# Patient Record
Sex: MALE | Race: BLACK OR AFRICAN AMERICAN | HISPANIC OR LATINO | Employment: FULL TIME | URBAN - METROPOLITAN AREA
[De-identification: names, ages, dates, MRNs, and addresses within clinical notes are randomized per-mention and may not be internally consistent; named-entity substitution may affect disease eponyms.]

---

## 2018-06-21 ENCOUNTER — HISTORICAL (OUTPATIENT)
Dept: ADMINISTRATIVE | Facility: HOSPITAL | Age: 58
End: 2018-06-21

## 2018-11-19 ENCOUNTER — HISTORICAL (OUTPATIENT)
Dept: ADMINISTRATIVE | Facility: HOSPITAL | Age: 58
End: 2018-11-19

## 2018-11-19 LAB
ABS NEUT (OLG): 7.5 X10(3)/MCL (ref 2.1–9.2)
ALBUMIN SERPL-MCNC: 4.7 GM/DL (ref 3.4–5)
ALBUMIN/GLOB SERPL: 1.21 {RATIO} (ref 1.5–2.5)
ALP SERPL-CCNC: 105 UNIT/L (ref 38–126)
ALT SERPL-CCNC: 31 UNIT/L (ref 7–52)
AST SERPL-CCNC: 25 UNIT/L (ref 15–37)
BILIRUB SERPL-MCNC: 1.3 MG/DL (ref 0.2–1)
BILIRUBIN DIRECT+TOT PNL SERPL-MCNC: 0.2 MG/DL (ref 0–0.5)
BILIRUBIN DIRECT+TOT PNL SERPL-MCNC: 1.1 MG/DL
BUN SERPL-MCNC: 18 MG/DL (ref 7–18)
CALCIUM SERPL-MCNC: 9.5 MG/DL (ref 8.5–10)
CHLORIDE SERPL-SCNC: 103 MMOL/L (ref 98–107)
CHOLEST SERPL-MCNC: 236 MG/DL (ref 0–200)
CHOLEST/HDLC SERPL: 3.9 {RATIO}
CO2 SERPL-SCNC: 29 MMOL/L (ref 21–32)
CREAT SERPL-MCNC: 1.18 MG/DL (ref 0.6–1.3)
ERYTHROCYTE [DISTWIDTH] IN BLOOD BY AUTOMATED COUNT: 12.9 % (ref 11.5–17)
GLOBULIN SER-MCNC: 3.9 GM/DL (ref 1.2–3)
GLUCOSE SERPL-MCNC: 133 MG/DL (ref 74–106)
HCT VFR BLD AUTO: 48.1 % (ref 42–52)
HDLC SERPL-MCNC: 61 MG/DL (ref 35–60)
HGB BLD-MCNC: 15.6 GM/DL (ref 14–18)
LDLC SERPL CALC-MCNC: 149 MG/DL (ref 0–129)
LYMPHOCYTES # BLD AUTO: 2 X10(3)/MCL (ref 0.6–3.4)
LYMPHOCYTES NFR BLD AUTO: 19.1 % (ref 13–40)
MCH RBC QN AUTO: 31 PG (ref 27–31.2)
MCHC RBC AUTO-ENTMCNC: 32 GM/DL (ref 32–36)
MCV RBC AUTO: 96 FL (ref 80–94)
MONOCYTES # BLD AUTO: 0.9 X10(3)/MCL (ref 0.1–1.3)
MONOCYTES NFR BLD AUTO: 8.2 % (ref 0.1–24)
NEUTROPHILS NFR BLD AUTO: 72.7 % (ref 47–80)
PLATELET # BLD AUTO: 259 X10(3)/MCL (ref 130–400)
PMV BLD AUTO: 8.8 FL (ref 9.4–12.4)
POTASSIUM SERPL-SCNC: 3.7 MMOL/L (ref 3.5–5.1)
PROT SERPL-MCNC: 8.6 GM/DL (ref 6.4–8.2)
PSA SERPL-MCNC: 0.95 NG/ML (ref 0–3.5)
RBC # BLD AUTO: 5.03 X10(6)/MCL (ref 4.7–6.1)
SODIUM SERPL-SCNC: 140 MMOL/L (ref 136–145)
TRIGL SERPL-MCNC: 91 MG/DL (ref 30–150)
TSH SERPL-ACNC: 3.31 MIU/ML (ref 0.35–4.94)
VLDLC SERPL CALC-MCNC: 18.2 MG/DL
WBC # SPEC AUTO: 10.4 X10(3)/MCL (ref 4.5–11.5)

## 2018-11-20 LAB
EST. AVERAGE GLUCOSE BLD GHB EST-MCNC: 128 MG/DL
HBA1C MFR BLD: 6.1 % (ref 4.4–6.4)

## 2019-01-04 LAB — CRC RECOMMENDATION EXT: NORMAL

## 2019-01-28 ENCOUNTER — HISTORICAL (OUTPATIENT)
Dept: ADMINISTRATIVE | Facility: HOSPITAL | Age: 59
End: 2019-01-28

## 2019-01-28 LAB
ABS NEUT (OLG): 8 X10(3)/MCL (ref 2.1–9.2)
ALBUMIN SERPL-MCNC: 4.4 GM/DL (ref 3.4–5)
ALBUMIN/GLOB SERPL: 1.38 {RATIO} (ref 1.5–2.5)
ALP SERPL-CCNC: 111 UNIT/L (ref 38–126)
ALT SERPL-CCNC: 34 UNIT/L (ref 7–52)
AST SERPL-CCNC: 26 UNIT/L (ref 15–37)
BILIRUB SERPL-MCNC: 1 MG/DL (ref 0.2–1)
BILIRUBIN DIRECT+TOT PNL SERPL-MCNC: 0.2 MG/DL (ref 0–0.5)
BILIRUBIN DIRECT+TOT PNL SERPL-MCNC: 0.8 MG/DL
BUN SERPL-MCNC: 23 MG/DL (ref 7–18)
CALCIUM SERPL-MCNC: 9.3 MG/DL (ref 8.5–10)
CHLORIDE SERPL-SCNC: 101 MMOL/L (ref 98–107)
CO2 SERPL-SCNC: 28 MMOL/L (ref 21–32)
CREAT SERPL-MCNC: 1.54 MG/DL (ref 0.6–1.3)
ERYTHROCYTE [DISTWIDTH] IN BLOOD BY AUTOMATED COUNT: 11.8 % (ref 11.5–17)
GLOBULIN SER-MCNC: 3.2 GM/DL (ref 1.2–3)
GLUCOSE SERPL-MCNC: 128 MG/DL (ref 74–106)
HCT VFR BLD AUTO: 43.8 % (ref 42–52)
HGB BLD-MCNC: 14.3 GM/DL (ref 14–18)
LYMPHOCYTES # BLD AUTO: 2.1 X10(3)/MCL (ref 0.6–3.4)
LYMPHOCYTES NFR BLD AUTO: 19.2 % (ref 13–40)
MCH RBC QN AUTO: 30.8 PG (ref 27–31.2)
MCHC RBC AUTO-ENTMCNC: 33 GM/DL (ref 32–36)
MCV RBC AUTO: 94 FL (ref 80–94)
MONOCYTES # BLD AUTO: 0.7 X10(3)/MCL (ref 0.1–1.3)
MONOCYTES NFR BLD AUTO: 6.5 % (ref 0.1–24)
NEUTROPHILS NFR BLD AUTO: 74.3 % (ref 47–80)
PLATELET # BLD AUTO: 229 X10(3)/MCL (ref 130–400)
PMV BLD AUTO: 8.8 FL (ref 9.4–12.4)
POTASSIUM SERPL-SCNC: 4.2 MMOL/L (ref 3.5–5.1)
PROT SERPL-MCNC: 7.6 GM/DL (ref 6.4–8.2)
RBC # BLD AUTO: 4.64 X10(6)/MCL (ref 4.7–6.1)
SODIUM SERPL-SCNC: 138 MMOL/L (ref 136–145)
TSH SERPL-ACNC: 2.38 MIU/ML (ref 0.35–4.94)
WBC # SPEC AUTO: 10.8 X10(3)/MCL (ref 4.5–11.5)

## 2019-11-27 ENCOUNTER — HISTORICAL (OUTPATIENT)
Dept: ADMINISTRATIVE | Facility: HOSPITAL | Age: 59
End: 2019-11-27

## 2019-11-27 LAB
ABS NEUT (OLG): 6.6 X10(3)/MCL (ref 2.1–9.2)
ALBUMIN SERPL-MCNC: 4.4 GM/DL (ref 3.4–5)
ALBUMIN/GLOB SERPL: 1.26 {RATIO} (ref 1.5–2.5)
ALP SERPL-CCNC: 97 UNIT/L (ref 38–126)
ALT SERPL-CCNC: 37 UNIT/L (ref 7–52)
AST SERPL-CCNC: 27 UNIT/L (ref 15–37)
BILIRUB SERPL-MCNC: 1.1 MG/DL (ref 0.2–1)
BILIRUBIN DIRECT+TOT PNL SERPL-MCNC: 0.2 MG/DL (ref 0–0.5)
BILIRUBIN DIRECT+TOT PNL SERPL-MCNC: 0.9 MG/DL
BUN SERPL-MCNC: 16 MG/DL (ref 7–18)
CALCIUM SERPL-MCNC: 9.4 MG/DL (ref 8.5–10)
CHLORIDE SERPL-SCNC: 105 MMOL/L (ref 98–107)
CHOLEST SERPL-MCNC: 161 MG/DL (ref 0–200)
CHOLEST/HDLC SERPL: 2.8 {RATIO}
CO2 SERPL-SCNC: 29 MMOL/L (ref 21–32)
CREAT SERPL-MCNC: 1.07 MG/DL (ref 0.6–1.3)
ERYTHROCYTE [DISTWIDTH] IN BLOOD BY AUTOMATED COUNT: 12.6 % (ref 11.5–17)
EST. AVERAGE GLUCOSE BLD GHB EST-MCNC: 126 MG/DL
GLOBULIN SER-MCNC: 3.5 GM/DL (ref 1.2–3)
GLUCOSE SERPL-MCNC: 151 MG/DL (ref 74–106)
HBA1C MFR BLD: 6 % (ref 4.4–6.4)
HCT VFR BLD AUTO: 46.1 % (ref 42–52)
HDLC SERPL-MCNC: 57 MG/DL (ref 35–60)
HGB BLD-MCNC: 16 GM/DL (ref 14–18)
LDLC SERPL CALC-MCNC: 94 MG/DL (ref 0–129)
LYMPHOCYTES # BLD AUTO: 2.2 X10(3)/MCL (ref 0.6–3.4)
LYMPHOCYTES NFR BLD AUTO: 23.5 % (ref 13–40)
MCH RBC QN AUTO: 30.7 PG (ref 27–31.2)
MCHC RBC AUTO-ENTMCNC: 35 GM/DL (ref 32–36)
MCV RBC AUTO: 88 FL (ref 80–94)
MONOCYTES # BLD AUTO: 0.6 X10(3)/MCL (ref 0.1–1.3)
MONOCYTES NFR BLD AUTO: 5.9 % (ref 0.1–24)
NEUTROPHILS NFR BLD AUTO: 70.6 % (ref 47–80)
PLATELET # BLD AUTO: 258 X10(3)/MCL (ref 130–400)
PMV BLD AUTO: 9 FL (ref 9.4–12.4)
POTASSIUM SERPL-SCNC: 4.3 MMOL/L (ref 3.5–5.1)
PROT SERPL-MCNC: 7.9 GM/DL (ref 6.4–8.2)
PSA SERPL-MCNC: 2.08 NG/ML (ref 0–3.5)
RBC # BLD AUTO: 5.22 X10(6)/MCL (ref 4.7–6.1)
SODIUM SERPL-SCNC: 142 MMOL/L (ref 136–145)
TRIGL SERPL-MCNC: 76 MG/DL (ref 30–150)
TSH SERPL-ACNC: 2.2 MIU/ML (ref 0.35–4.94)
VLDLC SERPL CALC-MCNC: 15.2 MG/DL
WBC # SPEC AUTO: 9.4 X10(3)/MCL (ref 4.5–11.5)

## 2020-07-22 ENCOUNTER — HISTORICAL (OUTPATIENT)
Dept: ADMINISTRATIVE | Facility: HOSPITAL | Age: 60
End: 2020-07-22

## 2020-07-22 LAB
ABS NEUT (OLG): 6.2 X10(3)/MCL (ref 2.1–9.2)
CHOLEST SERPL-MCNC: 223 MG/DL (ref 0–200)
CHOLEST/HDLC SERPL: 4.1 {RATIO}
ERYTHROCYTE [DISTWIDTH] IN BLOOD BY AUTOMATED COUNT: 13.3 % (ref 11.5–17)
EST. AVERAGE GLUCOSE BLD GHB EST-MCNC: 134 MG/DL
HBA1C MFR BLD: 6.3 % (ref 4.4–6.4)
HCT VFR BLD AUTO: 46.9 % (ref 42–52)
HDLC SERPL-MCNC: 54 MG/DL (ref 35–60)
HGB BLD-MCNC: 15.4 GM/DL (ref 14–18)
LDLC SERPL CALC-MCNC: 144 MG/DL (ref 0–129)
LYMPHOCYTES # BLD AUTO: 2.2 X10(3)/MCL (ref 0.6–3.4)
LYMPHOCYTES NFR BLD AUTO: 23.7 % (ref 13–40)
MCH RBC QN AUTO: 30.7 PG (ref 27–31.2)
MCHC RBC AUTO-ENTMCNC: 33 GM/DL (ref 32–36)
MCV RBC AUTO: 94 FL (ref 80–94)
MONOCYTES # BLD AUTO: 0.9 X10(3)/MCL (ref 0.1–1.3)
MONOCYTES NFR BLD AUTO: 9.5 % (ref 0.1–24)
NEUTROPHILS NFR BLD AUTO: 66.8 % (ref 47–80)
PLATELET # BLD AUTO: 150 X10(3)/MCL (ref 130–400)
PMV BLD AUTO: 10.5 FL (ref 9.4–12.4)
RBC # BLD AUTO: 5.01 X10(6)/MCL (ref 4.7–6.1)
TRIGL SERPL-MCNC: 156 MG/DL (ref 30–150)
VLDLC SERPL CALC-MCNC: 31.2 MG/DL
WBC # SPEC AUTO: 9.3 X10(3)/MCL (ref 4.5–11.5)

## 2021-01-27 ENCOUNTER — HISTORICAL (OUTPATIENT)
Dept: ADMINISTRATIVE | Facility: HOSPITAL | Age: 61
End: 2021-01-27

## 2021-01-27 LAB
ABS NEUT (OLG): 5.6 X10(3)/MCL (ref 2.1–9.2)
ALBUMIN SERPL-MCNC: 4.1 GM/DL (ref 3.4–5)
ALBUMIN/GLOB SERPL: 1.58 {RATIO} (ref 1.5–2.5)
ALP SERPL-CCNC: 83 UNIT/L (ref 38–126)
ALT SERPL-CCNC: 28 UNIT/L (ref 7–52)
AST SERPL-CCNC: 27 UNIT/L (ref 15–37)
BILIRUB SERPL-MCNC: 1.2 MG/DL (ref 0.2–1)
BILIRUBIN DIRECT+TOT PNL SERPL-MCNC: 0.2 MG/DL (ref 0–0.5)
BILIRUBIN DIRECT+TOT PNL SERPL-MCNC: 1 MG/DL
BUN SERPL-MCNC: 17 MG/DL (ref 7–18)
CALCIUM SERPL-MCNC: 9.1 MG/DL (ref 8.5–10.1)
CHLORIDE SERPL-SCNC: 103 MMOL/L (ref 98–107)
CHOLEST SERPL-MCNC: 195 MG/DL (ref 0–200)
CHOLEST/HDLC SERPL: 3.4 {RATIO}
CO2 SERPL-SCNC: 25 MMOL/L (ref 21–32)
CREAT SERPL-MCNC: 1.16 MG/DL (ref 0.6–1.3)
ERYTHROCYTE [DISTWIDTH] IN BLOOD BY AUTOMATED COUNT: 12.4 % (ref 11.5–17)
EST. AVERAGE GLUCOSE BLD GHB EST-MCNC: 128 MG/DL
GLOBULIN SER-MCNC: 2.6 GM/DL (ref 1.2–3)
GLUCOSE SERPL-MCNC: 134 MG/DL (ref 74–106)
HBA1C MFR BLD: 6.1 % (ref 4.4–6.4)
HCT VFR BLD AUTO: 44.4 % (ref 42–52)
HDLC SERPL-MCNC: 57 MG/DL (ref 35–60)
HGB BLD-MCNC: 14.9 GM/DL (ref 14–18)
LDLC SERPL CALC-MCNC: 124 MG/DL (ref 0–129)
LYMPHOCYTES # BLD AUTO: 1.6 X10(3)/MCL (ref 0.6–3.4)
LYMPHOCYTES NFR BLD AUTO: 20.9 % (ref 13–40)
MCH RBC QN AUTO: 30.7 PG (ref 27–31.2)
MCHC RBC AUTO-ENTMCNC: 34 GM/DL (ref 32–36)
MCV RBC AUTO: 91 FL (ref 80–94)
MONOCYTES # BLD AUTO: 0.6 X10(3)/MCL (ref 0.1–1.3)
MONOCYTES NFR BLD AUTO: 8.1 % (ref 0.1–24)
NEUTROPHILS NFR BLD AUTO: 71 % (ref 47–80)
PLATELET # BLD AUTO: 231 X10(3)/MCL (ref 130–400)
PMV BLD AUTO: 9.3 FL (ref 9.4–12.4)
POTASSIUM SERPL-SCNC: 4.4 MMOL/L (ref 3.5–5.1)
PROT SERPL-MCNC: 6.7 GM/DL (ref 6.4–8.2)
PSA SERPL-MCNC: 2.09 NG/ML (ref 0–3.5)
RBC # BLD AUTO: 4.86 X10(6)/MCL (ref 4.7–6.1)
SODIUM SERPL-SCNC: 139 MMOL/L (ref 136–145)
TRIGL SERPL-MCNC: 97 MG/DL (ref 30–150)
TSH SERPL-ACNC: 3.15 MIU/ML (ref 0.35–4.94)
VLDLC SERPL CALC-MCNC: 19.4 MG/DL
WBC # SPEC AUTO: 7.8 X10(3)/MCL (ref 4.5–11.5)

## 2021-07-07 ENCOUNTER — HISTORICAL (OUTPATIENT)
Dept: ADMINISTRATIVE | Facility: HOSPITAL | Age: 61
End: 2021-07-07

## 2021-07-07 LAB
ALBUMIN SERPL-MCNC: 4.5 GM/DL (ref 3.4–5)
ALBUMIN/GLOB SERPL: 1.55 {RATIO} (ref 1.5–2.5)
ALP SERPL-CCNC: 109 UNIT/L (ref 38–126)
ALT SERPL-CCNC: 40 UNIT/L (ref 7–52)
AST SERPL-CCNC: 27 UNIT/L (ref 15–37)
BILIRUB SERPL-MCNC: 1.2 MG/DL (ref 0.2–1)
BILIRUBIN DIRECT+TOT PNL SERPL-MCNC: 0.2 MG/DL (ref 0–0.5)
BILIRUBIN DIRECT+TOT PNL SERPL-MCNC: 1 MG/DL
BUN SERPL-MCNC: 14 MG/DL (ref 7–18)
CALCIUM SERPL-MCNC: 9.8 MG/DL (ref 8.5–10.1)
CHLORIDE SERPL-SCNC: 100 MMOL/L (ref 98–107)
CHOLEST SERPL-MCNC: 245 MG/DL (ref 0–200)
CHOLEST/HDLC SERPL: 4.5 {RATIO}
CO2 SERPL-SCNC: 31 MMOL/L (ref 21–32)
CREAT SERPL-MCNC: 1.15 MG/DL (ref 0.6–1.3)
EST CREAT CLEARANCE SER (OHS): 127.47 ML/MIN
EST. AVERAGE GLUCOSE BLD GHB EST-MCNC: 157 MG/DL
GLOBULIN SER-MCNC: 2.9 GM/DL (ref 1.2–3)
GLUCOSE SERPL-MCNC: 162 MG/DL (ref 74–106)
HBA1C MFR BLD: 7.1 % (ref 4.4–6.4)
HDLC SERPL-MCNC: 55 MG/DL (ref 35–60)
LDLC SERPL CALC-MCNC: 167 MG/DL (ref 0–129)
POTASSIUM SERPL-SCNC: 4 MMOL/L (ref 3.5–5.1)
PROT SERPL-MCNC: 7.4 GM/DL (ref 6.4–8.2)
SODIUM SERPL-SCNC: 139 MMOL/L (ref 136–145)
TRIGL SERPL-MCNC: 190 MG/DL (ref 30–150)
VLDLC SERPL CALC-MCNC: 38 MG/DL

## 2022-01-03 ENCOUNTER — HISTORICAL (OUTPATIENT)
Dept: ADMINISTRATIVE | Facility: HOSPITAL | Age: 62
End: 2022-01-03

## 2022-04-10 ENCOUNTER — HISTORICAL (OUTPATIENT)
Dept: ADMINISTRATIVE | Facility: HOSPITAL | Age: 62
End: 2022-04-10

## 2022-04-28 VITALS
DIASTOLIC BLOOD PRESSURE: 88 MMHG | SYSTOLIC BLOOD PRESSURE: 146 MMHG | OXYGEN SATURATION: 94 % | BODY MASS INDEX: 40.84 KG/M2 | HEIGHT: 70 IN | WEIGHT: 285.25 LBS

## 2022-05-03 NOTE — HISTORICAL [facility] CERNER
This is a historical note converted from Harinder. Formatting and pictures may have been removed.  Please reference Harinder for original formatting and attached multimedia. Chief Complaint  CPX FASTING  History of Present Illness  58?year old male presents for wellness visit.  Blood Pressure - 130/96  BMI - 41.42  Immunizations - up to date  Prostate?Cancer Screening - PSA and HANNAH?today  Colon Cancer Screening - up to date, screening colonoscopy 2012 repeat in 5 years  Diet - Average  Exercise - Minimal  Hypertension remains elevated despite compliance with amlodipine 10 mg daily.  Anxiety stable with?Zoloft 50 mg daily and Xanax 0.5 mg?daily as needed for overwhelming anxiety  Hypercholesterolemia is?previously controlled with?Crestor 20 mg daily. ?Patient tolerating well without any side effects.  Review of Systems  Constitutional:?No weight loss, no fever, no fatigue, no chills, no night sweats,?no weakness  Eyes:?No blurred vision,?no redness,?no drainage,?no ocular?pain  HEENT:?No sore throat,?no ear pain, no sinus pressure, no nasal congestion, no rhinorrhea, no postnasal drip  Respiratory:?No cough, no wheezing, no sputum production, no shortness of breath  Cardiovascular:?No chest pain, no palpitations, no dyspnea on exertion,?no orthopnea  Gastrointestinal:?No nausea, no vomiting, no abdominal pain, no diarrhea,?no constipation, no melena,?no hematochezia  Genitourinary:?No dysuria, no hematuria, no frequency, no urgency, no incontinence, no discharge  Musculoskeletal:?No myalgias, no arthralgias, no weakness, no joint effusion, no edema  Integumentary:?No rashes, no hives, no itching, no lesions, no jaundice  Neurologic:?No headaches, no numbness, no tingling, no weakness, no dizziness  Psychiatric:?No anxiety, no irritability, no depression,?no suicidal ideations, no?homicidal ideations,?no delusions, no hallucinations  Endocrine:?No polyuria, no polydipsia, no polyphagia  Hematology:?No bruising, no  lymphadenopathy,?no paleness  ?  Physical Exam  Vitals & Measurements  HR:?70(Peripheral)? BP:?130/96?  HT:?177.5?cm? HT:?177.5?cm? WT:?130.5?kg? WT:?130.5?kg? BMI:?41.42?  General:?Well developed, Well-nourished, in No acute distress, A&O x 4  Eye:?PERRLA, EOMI, Clear conjunctiva, Eyelids unremarkable  Ears:?Bilateral EAC clear?and?Bilateral TM clear  Nose:? Mucosa normal, No rhinorrhea, No lesions  O/P:??No?erythema,?No tonsillar hypertrophy,?No tonsillar exudates,?No cobblestoning  Neck:?Soft, Nontender, No thyromegaly, Full range of motion, No cervical lymphadenopathy, No JVD  Cardiovascular:?Regular rate and rhythm, No murmurs, No gallops, No rubs  Respiratory:?Clear to auscultation bilaterally, No wheezes, No rhonchi, No?crackles  Abdomen:? Soft, Nontender, No hepatosplenomegaly, Normal and equal bowel sounds, No masses, No rebound, No guarding  Musculoskeletal:? No tenderness, FROM, No joint abnormality, No clubbing, No cyanosis,No?edema  Neurologic:? No motor or sensory deficits, Reflexes 2+ throughout, CN II-XII grossly intact  Integumentary:?No rashes or skin lesions  ?HANNAH - No anal growths or lesions, No rectal masses, Normal sphincter tone, No prostate nodules,? Symmetric, No tenderness,? prostate firm, prostate [id number]  Assessment/Plan  1.?Wellness examination?Z00.00  ?CBC, CMP, FLP, TSH, PSA  Discussed importance of maintaining a balanced diet and regular exercise  HANNAH performed today  Ordered:  Clinic Follow up, *Est. 12/17/18 15:30:00 CST, Order for future visit, Hypercholesteremia, [facility]  External Referral, Screening colonoscopy, GI, [facility], 11/19/18 21:14:00 CST, Wellness examination  Preventative Health Care Est 40-64 years [id number] PC, Wellness examination, [facility], 11/19/18 14:09:00 CST  ?  2.?Needs flu shot?Z23  ?Influenza vaccine IM today  Ordered:  Clinic Follow up, *Est. 12/17/18 15:30:00 CST, Order for future visit, Hypercholesteremia, [facility]  [facility]  ?  3.?Anxiety?F41.9  ?Continue Zoloft 50 mg daily and Xanax 0.5 mg daily as needed for overwhelming anxiety #30 with 2 refills  Ordered:  Clinic Follow up, *Est. 12/17/18 15:30:00 CST, Order for future visit, Hypercholesteremia, [facility]  ?  4.?Hypertension?I10  ?Discontinue amlodipine and start valsartan HCT 80/12.5 mg daily  Ordered:  Clinic Follow up, *Est. 12/17/18 15:30:00 CST, Order for future visit, Hypercholesteremia, [facility]  ?  5.?Hypercholesteremia?E78.00  ?CMP and FLP today  Ordered:  Clinic Follow up, *Est. 12/17/18 15:30:00 CST, Order for future visit, Hypercholesteremia, [facility]  ?  Orders:  alPRAzolam, 0.5 mg = 1 tab(s), Oral, Daily, # 30 tab(s), 2 Refill(s), Pharmacy: [facility]/pharmacy #[id number]  hydrochlorothiazide-valsartan, 1 tab(s), Oral, Daily, # 30 tab(s), 5 Refill(s), Pharmacy: [facility]/pharmacy #[id number]   Problem List/Past Medical History  Ongoing  Anxiety  BPH (benign prostatic hyperplasia)  Contact dermatitis  ED (erectile dysfunction)  Hypercholesteremia  Hypertension  Obesity  Historical  SAKINA - Obstructive sleep apnea  Procedure/Surgical History  Excision of gynecomastia   Medications  alPRAzolam 0.5 mg oral tablet, 0.5 mg= 1 tab(s), Oral, Daily, 2 refills  hydrochlorothiazide-valsartan 12.5 mg-80 mg oral tablet, 1 tab(s), Oral, Daily, 5 refills  Mobic 15 mg oral tablet, 15 mg= 1 tab(s), Oral, Daily, PRN, 5 refills  rosuvastatin 20 mg oral tablet, 20 mg= 1 tab(s), Oral, Daily, 3 refills  sildenafil 20 mg oral tablet, See Instructions, 5 refills  Zoloft 50 mg oral tablet, 50 mg= 1 tab(s), Oral, Daily, 3 refills  Allergies  No Known Medication Allergies  Social History  Alcohol  Current, 03/07/2018  Employment/School  Employed, Work/School description: teacher., 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  Cirrhosis of liver: Mother.  Hypertension.: Father.  Immunizations  Vaccine Date Status   influenza  virus vaccine, inactivated 11/19/2018 Given   tetanus/diphtheria/pertussis, acel(Tdap) 07/2016 Recorded   Health Maintenance  Health Maintenance  ???Pending?(in the next year)  ??? ??OverDue  ??? ? ? ?Diabetes Screening due??and every?  ??? ? ? ?Influenza Vaccine due??and every?  ??? ??Due?  ??? ? ? ?ADL Screening due??11/19/18??and every 1??year(s)  ??? ? ? ?Alcohol Misuse Screening due??11/19/18??and every 1??year(s)  ??? ? ? ?Aspirin Therapy for CVD Prevention due??11/19/18??and every 1??year(s)  ??? ? ? ?Colorectal Screening due??11/19/18??and every?  ??? ? ? ?Depression Screening due??11/19/18??and every?  ??? ? ? ?Hypertension Maintenance-Medication Prescribed due??11/19/18??and every 1??year(s)  ??? ? ? ?Hypertension Management-Education due??11/19/18??and every 1??year(s)  ??? ? ? ?Smoking Cessation due??11/19/18??Variable frequency  ???Satisfied?(in the past 1 year)  ??? ??Satisfied?  ??? ? ? ?Blood Pressure Screening on??11/19/18.??Satisfied by Triny Cornejo  ??? ? ? ?Body Mass Index Check on??11/19/18.??Satisfied by Triny Cornejo  ??? ? ? ?Diabetes Screening on??11/19/18.??Satisfied by Dameon Kohli  ??? ? ? ?Hypertension Management-BMP on??11/19/18.??Satisfied by Dameon Kohli  ??? ? ? ?Influenza Vaccine on??11/19/18.??Satisfied by Triny Cornejo  ??? ? ? ?Lipid Screening on??11/19/18.??Satisfied by Dameon Kohli  ??? ? ? ?Obesity Screening on??11/19/18.??Satisfied by Triny Cornejo  ?  ?

## 2022-05-03 NOTE — HISTORICAL [facility] C[facility]
This is a historical note converted from [employer]. Formatting and pictures may have been removed.  Please reference Harinder for original formatting and attached multimedia. Chief Complaint  CPX FASTING  History of Present Illness  61?year old male presents for wellness visit.  Blood Pressure - 160/98  BMI - 40.31  Immunizations - Flu vaccine and Shingrix today  Prostate?Cancer Screening - PSA and HANNAH?today  Colon Cancer Screening - up to date, screening colonoscopy 1/4/19 with Dr. Levy repeat in 5 years  Diet - Average  Exercise - walking daily with work  Hypertension uncontrolled with amlodipine 5 mg daily.??  Anxiety stable with Xanax 0.5 mg?as needed for overwhelming anxiety.?  Hypercholesterolemia?-?? in July.? Pt reported noncompliance with Crestor at that time.?  Prediabetes - HbA1C 6.3 in July.  ED improved with sildenafil 20mg as needed.  Moderate OA right knee not manageable with bracing.?  Review of Systems  Constitutional:?No weight loss, no fever, no fatigue, no chills, no night sweats,?no weakness  Eyes:?No blurred vision,?no redness,?no drainage,?no ocular?pain  HEENT:?No sore throat,?no ear pain, no sinus pressure, no nasal congestion, no rhinorrhea, no postnasal drip  Respiratory:?No cough, no wheezing, no sputum production, no shortness of breath  Cardiovascular:?No chest pain, no palpitations, no dyspnea on exertion,?no orthopnea  Gastrointestinal:?No nausea, no vomiting, no abdominal pain, no diarrhea,?no constipation, no melena,?no hematochezia  Genitourinary:?No dysuria, no hematuria, no frequency, no urgency, no incontinence, no discharge  Musculoskeletal:?No myalgias, no arthralgias, no weakness, no joint effusion, no edema  Integumentary:?No rashes, no hives, no itching, no lesions, no jaundice  Neurologic:?No headaches, no numbness, no tingling, no weakness, no dizziness  Psychiatric:?No anxiety, no irritability, no depression,?no suicidal ideations, no?homicidal ideations,?no  delusions, no hallucinations  Endocrine:?No polyuria, no polydipsia, no polyphagia  Hematology:?No bruising, no lymphadenopathy,?no paleness  ?  Physical Exam  Vitals & Measurements  HR:?60(Peripheral)? BP:?160/98? SpO2:?97%?  HT:?177.50?cm? WT:?127.000?kg? BMI:?40.31?  General:?Well developed, Well-nourished, in No acute distress, A&O x 4  Eye:?PERRLA, EOMI, Clear conjunctiva, Eyelids unremarkable  Ears:?Bilateral EAC clear?and?Bilateral TM clear  Nose:? Mucosa normal, No rhinorrhea, No lesions  O/P:??No?erythema,?No tonsillar hypertrophy,?No tonsillar exudates,?No cobblestoning  Neck:?Soft, Nontender, No thyromegaly, Full range of motion, No cervical lymphadenopathy, No JVD  Cardiovascular:?Regular rate and rhythm, No murmurs, No gallops, No rubs  Respiratory:?Clear to auscultation bilaterally, No wheezes, No rhonchi, No?crackles  Abdomen:? Soft, Nontender, No hepatosplenomegaly, Normal and equal bowel sounds, No masses, No rebound, No guarding  Musculoskeletal:? No tenderness, FROM, No joint abnormality, No clubbing, No cyanosis,No?edema  Neurologic:? No motor or sensory deficits, Reflexes 2+ throughout, CN II-XII grossly intact  Integumentary:?No rashes or skin lesions  ?HANNAH - No anal growths or lesions, No rectal masses, Normal sphincter tone, No prostate nodules,? Symmetric, No tenderness,? prostate firm, prostate [id number]  Assessment/Plan  1.?Wellness examination?Z00.00  ?Discussed the?importance of maintaining a balanced diet and regular exercise  Ordered:  CBC w/ Auto Diff, Routine collect, 01/27/21 8:46:00 CST, Blood, Order for future visit, Stop date 01/27/21 8:46:00 CST, Lab Collect, Wellness examination, 01/27/21 8:46:00 CST  Comprehensive Metabolic Panel, Routine collect, 01/27/21 8:46:00 CST, Blood, Order for future visit, Stop date 01/27/21 8:46:00 CST, Lab Collect, Wellness examination, 01/27/21 8:46:00 CST  Lab Collection Request, 01/27/21 8:46:00 CST, [facility], 01/27/21 8:46:00 CST, Wellness  examination  Lipid Panel, Routine collect, 01/27/21 8:46:00 CST, Blood, Order for future visit, Stop date 01/27/21 8:46:00 CST, Lab Collect, Wellness examination, 01/27/21 8:46:00 CST  Preventative Health Care Est 40-64 years [id number] PC, Wellness examination, [facility], 01/27/21 8:46:00 CST  Thyroid Stimulating Hormone, Routine collect, 01/27/21 8:46:00 CST, Blood, Order for future visit, Stop date 01/27/21 8:46:00 CST, Lab Collect, Wellness examination, 01/27/21 8:46:00 CST  ?  2.?Hypertension?I10  ?Increase amlodipine to 10 mg daily  Blood pressure check within 1 month  ?  3.?Prediabetes?R73.03  Ordered:  Hemoglobin A1c, Routine collect, 01/27/21 8:47:00 CST, Blood, Order for future visit, Stop date 01/27/21 8:47:00 CST, Lab Collect, Prediabetes, 01/27/21 8:47:00 CST  ?  4.?Hypercholesterolemia?E78.00  ?CMP and FLP today  ?  5.?Anxiety?F41.9  ?Continue Xanax 0.5 mg as needed for overwhelming anxiety  ?  6.?ED (erectile dysfunction)?N52.9  ?Refilled sildenafil 20 mg 2-5 tabs as needed  ?  7.?Osteoarthritis of knee?M17.10  ?Start meloxicam 15 mg daily  ?  8.?Encounter for immunization?Z23  Ordered:  Influenza Virus Vaccine, Inactivated, 0.5 mL, IM, Once-Unscheduled, first dose 01/27/21 8:47:00 CST  zoster vaccine, inactivated, 0.5 mL, IM, Once-NOW, first dose 01/27/21 8:47:00 CST, stop date 01/27/21 8:47:00 CST  ?  9.?Screening for prostate cancer?Z12.5  ?HANNAH performed today  Ordered:  Prostate Specific Antigen, Routine collect, 01/27/21 8:46:00 CST, Blood, Order for future visit, Stop date 01/27/21 8:46:00 CST, Lab Collect, Screening for prostate cancer, 01/27/21 8:46:00 CST  ?  Orders:  alPRAzolam, 0.5 mg = 1 tab(s), Oral, Daily, PRN PRN as needed for anxiety, # 30 tab(s), 2 Refill(s), Pharmacy: [facility]/pharmacy #[id number], 177.5, cm, Height/Length Dosing, 01/27/21 8:06:00 CST, 127, kg, Weight Dosing, 01/27/21 8:06:00 CST  amLODIPine, 10 mg = 1 tab(s), Oral, Daily, # 30 tab(s), 2 Refill(s), Pharmacy: [facility]/pharmacy  #[id number], 177.5, cm, Height/Length Dosing, 01/27/21 8:06:00 CST, 127, kg, Weight Dosing, 01/27/21 8:06:00 CST  meloxicam, 15 mg = 1 tab(s), Oral, Daily, # 30 tab(s), 5 Refill(s), [id number], 177.5, cm, Height/Length Dosing, 01/27/21 8:06:00 CST, 127, kg, Weight Dosing, 01/27/21 8:06:00 CST  rosuvastatin, 40 mg = 1 tab(s), Oral, Daily, # 90 tab(s), 3 Refill(s), [id number], 177.5, cm, Height/Length Dosing, 01/27/21 8:06:00 CST, 127, kg, Weight Dosing, 01/27/21 8:06:00 CST   Problem List/Past Medical History  Ongoing  Anxiety  BPH (benign prostatic hyperplasia)  Contact dermatitis  ED (erectile dysfunction)  Hypercholesterolemia  Hypertension  Obesity  Osteoarthritis of knee  Prediabetes  Historical  SAKINA - Obstructive sleep apnea  Procedure/Surgical History  Excision of gynecomastia   Medications  alPRAzolam 0.5 mg oral tablet, 0.5 mg= 1 tab(s), Oral, Daily, PRN, 2 refills  amlodipine 10 mg oral tablet, 10 mg= 1 tab(s), Oral, Daily, 2 refills  Mobic 15 mg oral tablet, 15 mg= 1 tab(s), Oral, Daily, 5 refills  rosuvastatin 40 mg oral tablet, 40 mg= 1 tab(s), Oral, Daily, 3 refills  sildenafil 20 mg oral tablet, See Instructions, 2 refills  Allergies  No Known Medication Allergies  Social History  Abuse/Neglect  No, 01/27/2021  Alcohol  Current, 03/07/2018  Employment/School  Employed, Retired, 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  CVA - Cerebrovascular accident: Father.  Cirrhosis of liver: Mother.  Hypertension.: Father.  Sister: History is negative  Sister: History is negative  Sister: History is negative  Son: History is negative  Son: History is negative  Immunizations  Vaccine Date Status   zoster vaccine, inactivated 01/27/2021 Given   influenza virus vaccine,  inactivated 01/27/2021 Given   pneumococcal 13-valent conjugate vaccine 11/27/2019 Given   influenza virus vaccine, inactivated 11/01/2019 Recorded   influenza virus vaccine, inactivated 11/19/2018 Given   tetanus/diphtheria/pertussis, acel(Tdap) 07/2016 Recorded   influenza virus vaccine, inactivated 10/16/2014 Recorded   influenza virus vaccine, inactivated 10/15/2012 Recorded   Health Maintenance  Health Maintenance  ???Pending?(in the next year)  ??? ??OverDue  ??? ? ? ?Hypertension Management-BMP due??11/26/20??and every 1??year(s)  ??? ??Due?  ??? ? ? ?Alcohol Misuse Screening due??01/02/21??and every 1??year(s)  ??? ? ? ?ADL Screening due??01/27/21??and every 1??year(s)  ??? ? ? ?Aspirin Therapy for CVD Prevention due??01/27/21??and every 1??year(s)  ??? ? ? ?Depression Screening due??01/27/21??Unknown Frequency  ??? ? ? ?Hypertension Management-Education due??01/27/21??and every 1??year(s)  ??? ??Due In Future?  ??? ? ? ?Diabetes Screening not due until??07/22/21??and every 1??year(s)  ??? ? ? ?Influenza Vaccine not due until??10/01/21??and every 1??day(s)  ??? ? ? ?Obesity Screening not due until??01/01/22??and every 1??year(s)  ???Satisfied?(in the past 1 year)  ??? ??Satisfied?  ??? ? ? ?Blood Pressure Screening on??01/27/21.??Satisfied by Triny Cornejo LPN  ??? ? ? ?Body Mass Index Check on??01/27/21.??Satisfied by Triny Cornejo LPN  ??? ? ? ?Diabetes Screening on??07/22/20.??Satisfied by Chata Giang  ??? ? ? ?Hypertension Management-Blood Pressure on??01/27/21.??Satisfied by Triny Cornejo LPN  ??? ? ? ?Influenza Vaccine on??01/27/21.??Satisfied by Triny Cornejo LPN  ??? ? ? ?Lipid Screening on??07/22/20.??Satisfied by Emperatriz Raman  ??? ? ? ?Obesity Screening on??01/27/21.??Satisfied by Triny Cornejo LPN  ?

## 2022-05-03 NOTE — HISTORICAL [facility] CERNER
This is a historical note converted from Harinder. Formatting and pictures may have been removed.  Please reference Harinder for original formatting and attached multimedia. Chief Complaint  6 MTH F/U  SINUS DRIP, CHEST CONGESTION  History of Present Illness  Hypertension remains?uncontrolled with amlodipine?10 mg daily.??  Anxiety stable with Xanax 0.5 mg?as needed for overwhelming anxiety.?  Hypercholesterolemia?-?? in January.??Patient currently treated with rosuvastatin 40 mg daily.  Prediabetes - HbA1C 6.1 in January.  ED improved with sildenafil 20mg as needed.  Moderate OA right knee managing with Mobic 15 mg daily.?  Recurrent right lower quadrant abdominal pain?approximately once or twice a month.? Patient has a history of a hernia repair with mesh.? Patient does note change in stool caliber at times.  4 to 5-day history of nasal congestion,?sinus pressure and rhinorrhea. ?Denies any fever or chills.  Review of Systems  Constitutional:?No weight loss, no fever, no fatigue, no chills, no night sweats,?no weakness  Eyes:?No blurred vision,?no redness,?no drainage,?no ocular?pain  HEENT:?No sore throat,?no ear pain, sinus pressure, nasal congestion, rhinorrhea, postnasal drip  Respiratory:?No cough, no wheezing, no sputum production, no shortness of breath  Cardiovascular:?No chest pain, no palpitations, no dyspnea on exertion,?no orthopnea  Gastrointestinal:?No nausea, no vomiting, abdominal pain, no diarrhea,?no constipation, no melena,?no hematochezia  Genitourinary:?No dysuria, no hematuria, no frequency, no urgency, no incontinence, no discharge  Musculoskeletal:?No myalgias, arthralgias, no weakness, no joint effusion, no edema  Integumentary:?No rashes, no hives, no itching, no lesions, no jaundice  Neurologic:?No headaches, no numbness, no tingling, no weakness, no dizziness  Psychiatric:?anxiety, no irritability, no depression,?no suicidal ideations, no?homicidal ideations,?no delusions, no  hallucinations  Endocrine:?No polyuria, no polydipsia, no polyphagia  Hematology:?No bruising, no lymphadenopathy,?no paleness  ?  Physical Exam  Vitals & Measurements  HR:?74(Peripheral)? BP:?140/94? SpO2:?96%?  HT:?177.50?cm? WT:?130.300?kg? BMI:?41.36?  General:?Well developed, Well-nourished, in No acute distress, A&O x 4  Eye:?PERRLA, EOMI, Clear conjunctiva, Eyelids unremarkable  Ears:?Bilateral EAC clear?and?Bilateral TM clear  Nose:? Mucosa normal, No rhinorrhea, No lesions  O/P:??No?erythema,?No tonsillar hypertrophy,?No tonsillar exudates,?No cobblestoning  Neck:?Soft, Nontender, No thyromegaly, Full range of motion, No cervical lymphadenopathy, No JVD  Cardiovascular:?Regular rate and rhythm, No murmurs, No gallops, No rubs  Respiratory:?Clear to auscultation bilaterally, No wheezes, No rhonchi, No?crackles  Abdomen:? Soft, right lower quadrant tenderness to palpation, No hepatosplenomegaly, Normal and equal bowel sounds, No masses, No rebound, No guarding  Musculoskeletal:? No tenderness, FROM, No joint abnormality, No clubbing, No cyanosis,No?edema  Neurologic:? No motor or sensory deficits, Reflexes 2+ throughout, CN II-XII grossly intact  Integumentary:?No rashes or skin lesions  ?  Assessment/Plan  1.?Anxiety?F41.9  ?Refilled Xanax 0.5 mg daily as needed for overwhelming anxiety  Ordered:  Clinic Follow up, *Est. 01/07/22 3:00:00 CST, Wellness Physical, Order for future visit, Anxiety  Hypertension  Prediabetes  Hypercholesterolemia, [facility]  Office/Outpatient Visit Level 4 Established [id number] PC, Anxiety  Prediabetes  Hypertension  Hypercholesterolemia  Osteoarthritis of knee  Acute URI  Encounter for immunization  Right lower quadrant abdominal pain, [facility] AMB - [facility], 07/07/21 10:41:00 CDT  ?  2.?Prediabetes?R73.03  ?Hemoglobin A1c today  Ordered:  Clinic Follow up, *Est. 01/07/22 3:00:00 CST, Wellness Physical, Order for future visit, Anxiety  Hypertension  Prediabetes   Hypercholesterolemia, [facility]  Office/Outpatient Visit Level 4 Established 27972 PC, Anxiety  Prediabetes  Hypertension  Hypercholesterolemia  Osteoarthritis of knee  Acute URI  Encounter for immunization  Right lower quadrant abdominal pain, [facility] - [facility], 07/07/21 10:41:00 CDT  ?  3.?Hypertension?I10  ?Continue amlodipine 10 mg daily  Start HCTZ 12.5 mg daily  Ordered:  Clinic Follow up, *Est. 01/07/22 3:00:00 CST, Wellness Physical, Order for future visit, Anxiety  Hypertension  Prediabetes  Hypercholesterolemia, [facility]  Office/Outpatient Visit Level 4 Established 82173 PC, Anxiety  Prediabetes  Hypertension  Hypercholesterolemia  Osteoarthritis of knee  Acute URI  Encounter for immunization  Right lower quadrant abdominal pain, [facility] - [facility], 07/07/21 10:41:00 CDT  ?  4.?Hypercholesterolemia?E78.00  ?CMP and FLP today  Ordered:  Clinic Follow up, *Est. 01/07/22 3:00:00 CST, Wellness Physical, Order for future visit, Anxiety  Hypertension  Prediabetes  Hypercholesterolemia, [facility]  Office/Outpatient Visit Level 4 Established 60690 PC, Anxiety  Prediabetes  Hypertension  Hypercholesterolemia  Osteoarthritis of knee  Acute URI  Encounter for immunization  Right lower quadrant abdominal pain, [facility] AMB - [facility], 07/07/21 10:41:00 CDT  ?  5.?Osteoarthritis of knee?M17.10  ?Continue meloxicam 15 mg daily  Ordered:  Office/Outpatient Visit Level 4 Established 51213 PC, Anxiety  Prediabetes  Hypertension  Hypercholesterolemia  Osteoarthritis of knee  Acute URI  Encounter for immunization  Right lower quadrant abdominal pain, [facility] - [facility], 07/07/21 10:41:00 CDT  ?  6.?Acute URI?J06.9  ?Celestone 6 mg IM today  If symptoms fail to improve patient to contact office  Ordered:  Office/Outpatient Visit Level 4 Established 48297 PC, Anxiety  Prediabetes  Hypertension  Hypercholesterolemia  Osteoarthritis of knee  Acute URI  Encounter for immunization  Right lower quadrant  abdominal pain, [facility] - [facility], 07/07/21 10:41:00 CDT  ?  7.?Encounter for immunization?Z23  ?Shingrix today second dose  Ordered:  Office/Outpatient Visit Level 4 Established 18877 PC, Anxiety  Prediabetes  Hypertension  Hypercholesterolemia  Osteoarthritis of knee  Acute URI  Encounter for immunization  Right lower quadrant abdominal pain, [facility] AMB - [facility], 07/07/21 10:41:00 CDT  ?  8.?Right lower quadrant abdominal pain?R10.31  ?Concerning regarding?adhesions?and possible?partial?bowel obstruction  Ordered:  Office/Outpatient Visit Level 4 Established 16659 PC, Anxiety  Prediabetes  Hypertension  Hypercholesterolemia  Osteoarthritis of knee  Acute URI  Encounter for immunization  Right lower quadrant abdominal pain, [facility] AMB - [facility], 07/07/21 10:41:00 CDT  Schedule Diagnostics Study, CT Abdomen/Pelvis with and without contrast, Yes Oral Contrast Requested, Next Available, 07/07/21 10:42:00 CDT, Right lower quadrant abdominal pain  ?  Orders:  alPRAzolam, 0.5 mg = 1 tab(s), Oral, Daily, PRN PRN as needed for anxiety, # 30 tab(s), 3 Refill(s), [id number], 177.5, cm, Height/Length Dosing, 07/07/21 10:16:00 CDT, 130.3, kg, Weight Dosing, 07/07/21 10:16:00 CDT  hydrochlorothiazide, 12.5 mg = 1 tab(s), Oral, Daily, # 30 tab(s), 5 Refill(s), [id number], 177.5, cm, Height/Length Dosing, 07/07/21 10:16:00 CDT, 130.3, kg, Weight Dosing, 07/07/21 10:16:00 CDT  sildenafil, See Instructions, 5 tabs prior to sexual activity, # 50 tab(s), 5 Refill(s), Pharmacy: [facility], 177.5, cm, Height/Length Dosing, 07/07/21 10:16:00 CDT, 130.3, kg, Weight Dosing, 07/07/21 10:16:00 CDT  Referrals  Clinic Follow up, *Est. 01/07/22 3:00:00 CST, Wellness Physical, Order for future visit, Anxiety  Hypertension  Prediabetes  Hypercholesterolemia, [facility] AFP  Clinic Follow up, Order for future visit   Problem List/Past Medical History  Ongoing  Anxiety  BPH (benign prostatic  hyperplasia)  Contact dermatitis  ED (erectile dysfunction)  Hypercholesterolemia  Hypertension  Obesity  Osteoarthritis of knee  Prediabetes  Historical  SAKINA - Obstructive sleep apnea  Procedure/Surgical History  Excision of gynecomastia   Medications  alPRAzolam 0.5 mg oral tablet, 0.5 mg= 1 tab(s), Oral, Daily, PRN, 3 refills  amLODIPine 10 mg oral tablet, 10 mg= 1 tab(s), Oral, Daily, 3 refills  hydrochlorothiazide 12.5 mg oral tablet, 12.5 mg= 1 tab(s), Oral, Daily, 5 refills  Mobic 15 mg oral tablet, 15 mg= 1 tab(s), Oral, Daily, 5 refills  rosuvastatin 40 mg oral tablet, 40 mg= 1 tab(s), Oral, Daily, 3 refills  sildenafil 20 mg oral tablet, See Instructions, 5 refills  Allergies  No Known Medication Allergies  Social History  Abuse/Neglect  No, 07/07/2021  Alcohol  Current, 03/07/2018  Employment/School  Employed, Retired, 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  CVA - Cerebrovascular accident: Father.  Cirrhosis of liver: Mother.  Hypertension.: Father.  Sister: History is negative  Sister: History is negative  Sister: History is negative  Son: History is negative  Son: History is negative  Immunizations  Vaccine Date Status   zoster vaccine, inactivated 07/07/2021 Given   COVID-19, vector nr, [id number] - [facility] 03/05/2021 Recorded   zoster vaccine, inactivated 01/27/2021 Given   influenza virus vaccine, inactivated 01/27/2021 Given   pneumococcal 13-valent conjugate vaccine 11/27/2019 Given   influenza virus vaccine, inactivated 11/01/2019 Recorded   influenza virus vaccine, inactivated 11/19/2018 Given   tetanus/diphtheria/pertussis, acel(Tdap) 07/2016 Recorded   influenza virus vaccine, inactivated 10/16/2014 Recorded   influenza virus vaccine, inactivated 10/15/2012 Recorded   Health  Maintenance  Health Maintenance  ???Pending?(in the next year)  ??? ??OverDue  ??? ? ? ?Alcohol Misuse Screening due??01/02/21??and every 1??year(s)  ??? ??Due?  ??? ? ? ?ADL Screening due??07/07/21??and every 1??year(s)  ??? ? ? ?Aspirin Therapy for CVD Prevention due??07/07/21??and every 1??year(s)  ??? ? ? ?Depression Screening due??07/07/21??Unknown Frequency  ??? ? ? ?Hypertension Management-Education due??07/07/21??and every 1??year(s)  ??? ??Due In Future?  ??? ? ? ?Influenza Vaccine not due until??10/01/21??and every 1??day(s)  ??? ? ? ?Obesity Screening not due until??01/01/22??and every 1??year(s)  ??? ? ? ?Diabetes Screening not due until??01/27/22??and every 1??year(s)  ??? ? ? ?Hypertension Management-BMP not due until??01/27/22??and every 1??year(s)  ???Satisfied?(in the past 1 year)  ??? ??Satisfied?  ??? ? ? ?Blood Pressure Screening on??07/07/21.??Satisfied by Triny Cornejo LPN  ??? ? ? ?Body Mass Index Check on??07/07/21.??Satisfied by Triny Cornejo LPN  ??? ? ? ?Diabetes Screening on??07/07/21.??Satisfied by Dameon Kohli  ??? ? ? ?Hypertension Management-BMP on??07/07/21.??Satisfied by Dameon Kohli  ??? ? ? ?Influenza Vaccine on??01/27/21.??Satisfied by Triny Cornejo LPN  ??? ? ? ?Lipid Screening on??07/07/21.??Satisfied by Dameon Kohli  ??? ? ? ?Obesity Screening on??07/07/21.??Satisfied by Triny Cornejo LPN  ?

## 2022-05-03 NOTE — HISTORICAL [facility]
This is a historical note converted from Harinder. Formatting and pictures may have been removed.  Please reference Harinder for original formatting and attached multimedia. Chief Complaint  CPX FASTING  History of Present Illness  59?year old male presents for wellness visit.  Blood Pressure - 134/96  BMI - 40.18  Immunizations -denies pneumococcal vaccine  Prostate?Cancer Screening - PSA and HANNAH?today  Colon Cancer Screening - up to date, screening colonoscopy 2012 repeat in 5 years  Diet - Average  Exercise - Minimal  Hypertension previously controlled with amlodipine 5 mg daily. ?Patient does admit to?compliance lately.  Anxiety stable with Xanax 0.5 mg?as needed for overwhelming anxiety.? Pt requires 5 times weekly.?  Hypercholesterolemia?- ?Patient tolerating well without any side effects.? Pt increased Crestor to 40mg by his cardiologist in march.?  HbA1C 6.1 11/20/18.?  Right knee pain, popping, locking,?stiffness, swelling?over the last?4 to 6 weeks. ?Patient had a similar presentation a few years ago which resolved with corticosteroid injection.? Denies any direct injury.  ED improved with sildenafil as needed  Review of Systems  Constitutional:?No weight loss, no fever, no fatigue, no chills, no night sweats,?no weakness  Eyes:?No blurred vision,?no redness,?no drainage,?no ocular?pain  HEENT:?No sore throat,?no ear pain, no sinus pressure, no nasal congestion, no rhinorrhea, no postnasal drip  Respiratory:?No cough, no wheezing, no sputum production, no shortness of breath  Cardiovascular:?No chest pain, no palpitations, no dyspnea on exertion,?no orthopnea  Gastrointestinal:?No nausea, no vomiting, no abdominal pain, no diarrhea,?no constipation, no melena,?no hematochezia  Genitourinary:?No dysuria, no hematuria, no frequency, no urgency, no incontinence, no discharge  Musculoskeletal:?No myalgias,? arthralgias, no weakness, no joint effusion, no edema  Integumentary:?No rashes, no hives, no itching, no  lesions, no jaundice  Neurologic:?No headaches, no numbness, no tingling, no weakness, no dizziness  Psychiatric:?No anxiety, no irritability, no depression,?no suicidal ideations, no?homicidal ideations,?no delusions, no hallucinations  Endocrine:?No polyuria, no polydipsia, no polyphagia  Hematology:?No bruising, no lymphadenopathy,?no paleness  ?  Physical Exam  Vitals & Measurements  HR:?70(Peripheral)? BP:?134/96?  HT:?177.5?cm? WT:?126.6?kg? BMI:?40.18?  General:?Well developed, Well-nourished, in No acute distress, A&O x 4  Eye:?PERRLA, EOMI, Clear conjunctiva, Eyelids unremarkable  Ears:?Bilateral EAC clear?and?Bilateral TM clear  Nose:? Mucosa normal, No rhinorrhea, No lesions  O/P:??No?erythema,?No tonsillar hypertrophy,?No tonsillar exudates,?No cobblestoning  Neck:?Soft, Nontender, No thyromegaly, Full range of motion, No cervical lymphadenopathy, No JVD  Cardiovascular:?Regular rate and rhythm, No murmurs, No gallops, No rubs  Respiratory:?Clear to auscultation bilaterally, No wheezes, No rhonchi, No?crackles  Abdomen:? Soft, Nontender, No hepatosplenomegaly, Normal and equal bowel sounds, No masses, No rebound, No guarding  Musculoskeletal:? No tenderness, FROM, No joint abnormality, No clubbing, No cyanosis,No?edema  Neurologic:? No motor or sensory deficits, Reflexes 2+ throughout, CN II-XII grossly intact  Integumentary:?No rashes or skin lesions  HANNAH - No anal growths or lesions, No rectal masses, Normal sphincter tone, No prostate nodules,? Symmetric, No tenderness,? prostate firm, prostate [id number]  Right?Knee -?Negative?Lachman,?Positive?Asha, No effusion,??mildly limited?ROM, hamstring strength?5/5, quadriceps strength?5/5, No medial or lateral instability,?Medial?joint line?tenderness  Assessment/Plan  1.?Wellness examination?Z00.00  ?CBC, CMP, FLP, TSH today  Discussed the?importance of maintaining a balanced diet and regular exercise  Ordered:  Comprehensive Metabolic Panel, Routine  collect, 11/27/19 11:22:00 CST, Blood, Stop date 11/27/19 11:22:00 CST, Lab Collect, Wellness examination, 11/27/19 11:22:00 CST  Lipid Panel, Routine collect, 11/27/19 11:22:00 CST, Blood, Stop date 11/27/19 11:22:00 CST, Lab Collect, Wellness examination, 11/27/19 11:22:00 CST  Preventative Health Care Est 40-64 years [id number] PC, Wellness examination, [facility] AMB - AFP, 11/27/19 11:19:00 CST  Thyroid Stimulating Hormone, Routine collect, 11/27/19 11:22:00 CST, Blood, Stop date 11/27/19 11:22:00 CST, Lab Collect, Wellness examination, 11/27/19 11:22:00 CST  ?  2.?Anxiety?F41.9  ?Continue Xanax 0.5 mg daily as needed for anxiety  ?  3.?ED (erectile dysfunction)?N52.9  ?Sildenafil as needed  ?  4.?Hypercholesteremia?E78.00  ?CMP and FLP today  ?  5.?Hypertension?I10  ?Continue amlodipine 5 mg daily  ?  6.?Prediabetes?R73.03  ?HbA1C?today  ?  7.?Screening for prostate cancer?Z12.5  ?HANNAH performed  Ordered:  Prostate Specific Antigen, Routine collect, 11/27/19 11:22:00 CST, Blood, Stop date 11/27/19 11:22:00 CST, Lab Collect, Screening for prostate cancer, 11/27/19 11:22:00 CST  ?  8.?Immunization due?Z23  ?Prevnar IM today  ?  9.?Internal derangement of right knee?M23.91  ?Xray Right Knee -moderate?degenerative?joint disease noted?medial greater than lateral  Suspect?meniscus injury?as well  Discussed treatment options.? Patient elects to proceed with corticosteroid injection.? Kenalog 40 mg and lidocaine?1% 4 mL used for intra-articular injection. ?Aspiration attempted prior?without any?significant effusion?obtained. ?Patient tolerated well.  Meloxicam 15 mg daily  If symptoms fail to improve patient will contact my office will consider orthopedic referral  Ordered:  Lidocaine inj., 4 mL, Intra-Articular, Once, first dose 11/27/19 11:41:00 CST, stop date 11/27/19 11:41:00 CST, Mix with Kenalog for injection into affected Joint  triamcinolone, 40 mg, Intra-Articular, Once, first dose 11/27/19 12:00:00 CST, stop date  11/27/19 12:00:00 CST, Mix with Lidocaine for injection into affected joint  XR Knee Right 1 or 2 Views, Routine, 11/27/19 11:19:00 CST, Other (please specify), None, Ambulatory, Rad Type, Internal derangement of right knee, [facility], 11/27/19 11:19:00 CST  ?  Orders:  alPRAzolam, 0.5 mg = 1 tab(s), Oral, Daily, # 30 tab(s), 2 Refill(s), Pharmacy: [facility]/pharmacy #[id number]  meloxicam, 15 mg = 1 tab(s), Oral, Daily, PRN PRN pain, # 30 tab(s), 5 Refill(s), Pharmacy: [facility]/pharmacy #[id number]  asp/inj jnt/bursa, major [id number] PC, 11/27/19 11:41:00 CST, [facility]LINK AMB - AFP, 11/27/19 11:41:00 CST   Problem List/Past Medical History  Ongoing  Anxiety  BPH (benign prostatic hyperplasia)  Contact dermatitis  ED (erectile dysfunction)  Hypercholesteremia  Hypertension  Obesity  Historical  SAKINA - Obstructive sleep apnea  Procedure/Surgical History  Excision of gynecomastia   Medications  alPRAzolam 0.5 mg oral tablet, 0.5 mg= 1 tab(s), Oral, Daily, 2 refills  amLODIPine 5 mg oral tablet, 5 mg= 1 tab(s), Oral, Daily  Kenalog-40 injectable suspension, 40 mg, Intra-Articular, Once  lidocaine 1% injectable solution, 4 mL, Intra-Articular, Once  Mobic 15 mg oral tablet, 15 mg= 1 tab(s), Oral, Daily, PRN, 5 refills  rosuvastatin 40 mg oral tablet, 40 mg= 1 tab(s), Oral, Daily  sildenafil 20 mg oral tablet, See Instructions, 5 refills  Allergies  No Known Medication Allergies  Social History  Abuse/Neglect  No, 11/27/2019  Alcohol  Current, 03/07/2018  Employment/School  Employed, Work/School description: teacher., 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  CVA - Cerebrovascular accident: Father.  Cirrhosis of liver: Mother.  Hypertension.: Father.  Sister: History is negative  Sister: History is negative  Sister: History is negative  Immunizations  Vaccine Date Status   pneumococcal 13-valent conjugate vaccine  11/27/2019 Given   influenza virus vaccine, inactivated 11/01/2019 Recorded   influenza virus vaccine, inactivated 11/19/2018 Given   tetanus/diphtheria/pertussis, acel(Tdap) 07/2016 Recorded   influenza virus vaccine, inactivated 10/16/2014 Recorded   influenza virus vaccine, inactivated 10/15/2012 Recorded   Health Maintenance  Health Maintenance  ???Pending?(in the next year)  ??? ??OverDue  ??? ? ? ?Diabetes Screening due??and every?  ??? ? ? ?Alcohol Misuse Screening due??01/01/19??and every 1??year(s)  ??? ??Due?  ??? ? ? ?ADL Screening due??11/27/19??and every 1??year(s)  ??? ? ? ?Aspirin Therapy for CVD Prevention due??11/27/19??and every 1??year(s)  ??? ? ? ?Depression Screening due??11/27/19??and every?  ??? ? ? ?Hypertension Management-Education due??11/27/19??and every 1??year(s)  ??? ? ? ?Influenza Vaccine due??11/27/19??and every?  ??? ??Due In Future?  ??? ? ? ?Obesity Screening not due until??01/01/20??and every 1??year(s)  ??? ? ? ?Hypertension Management-BMP not due until??01/28/20??and every 1??year(s)  ??? ? ? ?Blood Pressure Screening not due until??06/09/20??and every 1??year(s)  ??? ? ? ?Body Mass Index Check not due until??06/09/20??and every 1??year(s)  ??? ? ? ?Hypertension Management-Blood Pressure not due until??06/09/20??and every 1??year(s)  ???Satisfied?(in the past 1 year)  ??? ??Satisfied?  ??? ? ? ?Blood Pressure Screening on??11/27/19.??Satisfied by Triny Cornejo LPN  ??? ? ? ?Body Mass Index Check on??11/27/19.??Satisfied by Triny Cornejo LPN  ??? ? ? ?Colorectal Screening on??01/04/19.??Satisfied by Anna Villalta  ??? ? ? ?Diabetes Screening on??11/27/19.??Satisfied by Dameon Kohli  ??? ? ? ?Hypertension Management-BMP on??11/27/19.??Satisfied by Dameon Kohli  ??? ? ? ?Influenza Vaccine on??11/01/19.??Satisfied by Shaheen Gómez Jr, MD  ??? ? ? ?Lipid Screening on??11/27/19.??Satisfied by Dameon Kohli  ??? ? ? ?Obesity Screening on??11/27/19.??Satisfied by Clemente  LPN, Triny  ?

## 2022-05-03 NOTE — HISTORICAL [facility]ER
This is a historical note converted from Harinder. Formatting and pictures may have been removed.  Please reference Harinder for original formatting and attached multimedia. Chief Complaint  6 WEEK F/U  History of Present Illness  1/20/19 patient developed?dizziness described as vertigo. ?Associated with palpitations and shortness of breath. ?Symptoms continue to worsen?through the last week.? Patient?then discontinued taking valsartan HCT and Crestor on Thursday due to?these being relatively new medications?for patient.? 1 month ago valsartan HCT?was increased to 160/25 mg daily.? He dose report experiencing chest pressure associated with SOB as well.? Symptoms began to improve starting on thursday.? Symptoms completely resolved by saturday and have not recurred.  Review of Systems  Constitutional:?No weight loss, no fever, no fatigue, no chills, no night sweats,?no weakness  Eyes:?No blurred vision,?no redness,?no drainage,?no ocular?pain  HEENT:?No sore throat,?no ear pain, no sinus pressure, no nasal congestion, no rhinorrhea, no postnasal drip  Respiratory:?No cough, no wheezing, no sputum production, shortness of breath  Cardiovascular:?chest pain, palpitations, no dyspnea on exertion,?no orthopnea  Gastrointestinal:?No nausea, no vomiting, no abdominal pain, no diarrhea,?no constipation, no melena,?no hematochezia  Genitourinary:?No dysuria, no hematuria, no frequency, no urgency, no incontinence, no discharge  Musculoskeletal:?No myalgias, no arthralgias, no weakness, no joint effusion, no edema  Integumentary:?No rashes, no hives, no itching, no lesions, no jaundice  Neurologic:?No headaches, no numbness, no tingling, no weakness, dizziness  Psychiatric:?No anxiety, no irritability, no depression,?no suicidal ideations, no?homicidal ideations,?no delusions, no hallucinations  Endocrine:?No polyuria, no polydipsia, no polyphagia  Hematology:?No bruising, no lymphadenopathy,?no paleness  ?  Physical Exam  Vitals  & Measurements  HR:?70(Peripheral)? BP:?114/90?  HT:?177.5?cm? WT:?126.8?kg? BMI:?40.25?  General:?Well developed, Well-nourished, in No acute distress, A&O x 4  Eye:?PERRLA, EOMI, Clear conjunctiva, Eyelids unremarkable  Ears:?Bilateral EAC clear?and?Bilateral TM clear  Nose:? Mucosa normal, No rhinorrhea, No lesions  O/P:??No?erythema,?No tonsillar hypertrophy,?No tonsillar exudates,?No cobblestoning  Neck:?Soft, Nontender, No thyromegaly, Full range of motion, No cervical lymphadenopathy, No JVD  Cardiovascular:?Regular rate and rhythm, No murmurs, No gallops, No rubs  Respiratory:?Clear to auscultation bilaterally, No wheezes, No rhonchi, No?crackles  Abdomen:? Soft, Nontender, No hepatosplenomegaly, Normal and equal bowel sounds, No masses, No rebound, No guarding  Musculoskeletal:? No tenderness, FROM, No joint abnormality, No clubbing, No cyanosis,No?edema  Neurologic:? No motor or sensory deficits, Reflexes 2+ throughout, CN II-XII grossly intact  Integumentary:?No rashes or skin lesions  ?  Assessment/Plan  1.?Dizziness?R42  ?EKG - NSR, No ST or T wave changes, left axis deviation  CBC, CMP, TSH today  Ordered:  Automated Diff, Routine collect, 01/28/19 16:46:00 CST, Blood, Collected, Stop date 01/28/19 16:46:00 CST, Lab Collect, Dizziness, 01/28/19 16:46:00 CST  CBC w/ Auto Diff, Routine collect, 01/28/19 16:46:00 CST, Blood, Stop date 01/28/19 16:46:00 CST, Lab Collect, Dizziness, 01/28/19 16:46:00 CST  Comprehensive Metabolic Panel, Routine collect, 01/28/19 16:46:00 CST, Blood, Stop date 01/28/19 16:46:00 CST, Lab Collect, Dizziness, 01/28/19 16:46:00 CST  External Referral, Chest Pain/Palpitations, Cardiology, First Available, 01/28/19 16:40:00 CST, Palpitation  SOB (shortness of breath)  Dizziness  Office/Outpatient Visit Level 4 Established [id number] PC, Dizziness  Palpitation  SOB (shortness of breath), [facility], 01/28/19 16:40:00 CST  Thyroid Stimulating Hormone, Routine collect, 01/28/19  16:46:00 CST, Blood, Stop date 01/28/19 16:46:00 CST, Lab Collect, Dizziness, 01/28/19 16:46:00 CST  ?  2.?Palpitation?R00.2  ?EKG - NSR, No ST or T wave changes, left axis deviation  CBC, CMP, TSH today  Ordered:  External Referral, Chest Pain/Palpitations, Cardiology, First Available, 01/28/19 16:40:00 CST, Palpitation  SOB (shortness of breath)  Dizziness  Office/Outpatient Visit Level 4 Established [id number] PC, Dizziness  Palpitation  SOB (shortness of breath), [facility], 01/28/19 16:40:00 CST  ?  3.?SOB (shortness of breath)?R06.02  ?EKG - NSR, No ST or T wave changes, left axis deviation  CBC, CMP, TSH today?  Ordered:  External Referral, Chest Pain/Palpitations, Cardiology, First Available, 01/28/19 16:40:00 CST, Palpitation  SOB (shortness of breath)  Dizziness  Office/Outpatient Visit Level 4 Established [id number] PC, Dizziness  Palpitation  SOB (shortness of breath), [facility], 01/28/19 16:40:00 CST  ?  4.?Hypertension?I10  ?Hold Valsartan HCT for now and monitor BP  ?   Problem List/Past Medical History  Ongoing  Anxiety  BPH (benign prostatic hyperplasia)  Contact dermatitis  ED (erectile dysfunction)  Hypercholesteremia  Hypertension  Obesity  Historical  SAKINA - Obstructive sleep apnea  Procedure/Surgical History  Excision of gynecomastia   Medications  alPRAzolam 0.5 mg oral tablet, 0.5 mg= 1 tab(s), Oral, Daily, 2 refills  clobetasol 0.05% topical cream, 1 chaz, TOP, BID  Mobic 15 mg oral tablet, 15 mg= 1 tab(s), Oral, Daily, PRN, 5 refills  rosuvastatin 20 mg oral tablet, 20 mg= 1 tab(s), Oral, Daily, 3 refills  sildenafil 20 mg oral tablet, See Instructions, 5 refills  Zoloft 50 mg oral tablet, 50 mg= 1 tab(s), Oral, Daily, 3 refills  Allergies  No Known Medication Allergies  Social History  Alcohol  Current, 03/07/2018  Employment/School  Employed, Work/School description: teacher., 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  Cirrhosis of liver: Mother.  Hypertension.: Father.  Immunizations  Vaccine Date Status   influenza virus vaccine, inactivated 11/19/2018 Given   tetanus/diphtheria/pertussis, acel(Tdap) 07/2016 Recorded   Health Maintenance  Health Maintenance  ???Pending?(in the next year)  ??? ??OverDue  ??? ? ? ?Diabetes Screening due??and every?  ??? ??Due?  ??? ? ? ?ADL Screening due??01/28/19??and every 1??year(s)  ??? ? ? ?Alcohol Misuse Screening due??01/28/19??and every 1??year(s)  ??? ? ? ?Aspirin Therapy for CVD Prevention due??01/28/19??and every 1??year(s)  ??? ? ? ?Depression Screening due??01/28/19??and every?  ??? ? ? ?Hypertension Maintenance-Medication Prescribed due??01/28/19??and every 1??year(s)  ??? ? ? ?Hypertension Management-Education due??01/28/19??and every 1??year(s)  ??? ? ? ?Smoking Cessation due??01/28/19??Variable frequency  ??? ??Due In Future?  ??? ? ? ?Hypertension Management-BMP not due until??11/19/19??and every 1??year(s)  ??? ? ? ?Blood Pressure Screening not due until??12/17/19??and every 1??year(s)  ??? ? ? ?Body Mass Index Check not due until??12/17/19??and every 1??year(s)  ??? ? ? ?Hypertension Management-Blood Pressure not due until??12/17/19??and every 1??year(s)  ???Satisfied?(in the past 1 year)  ??? ??Satisfied?  ??? ? ? ?Blood Pressure Screening on??01/28/19.??Satisfied by Triny Cornejo LPN  ??? ? ? ?Body Mass Index Check on??01/28/19.??Satisfied by Triny Cornejo LPN  ??? ? ? ?Colorectal Screening on??01/04/19.??Satisfied by Anna Villalta  ??? ? ? ?Diabetes Screening on??11/20/18.??Satisfied by Emperatriz Raman  ??? ? ? ?Hypertension Management-Blood Pressure on??01/28/19.??Satisfied by Triny Cornejo LPN  ??? ? ? ?Influenza Vaccine on??11/19/18.??Satisfied by Triny Cornejo LPN  ??? ? ? ?Lipid Screening on??11/19/18.??Satisfied by Dameon Kohli  ??? ? ? ?Obesity  Screening on??01/28/19.??Satisfied by Clemente OLVERA, Triny  ?  ?

## 2022-05-03 NOTE — HISTORICAL [facility]
This is a historical note converted from Harinder. Formatting and pictures may have been removed.  Please reference Harinder for original formatting and attached multimedia. Chief Complaint  REFILL XANAX/VIAGRA  History of Present Illness  Hypertension previously controlled with amlodipine 5 mg daily. ?Patient does admit to?compliance?over the last 3 weeks  Anxiety stable with Xanax 0.5 mg?as needed for overwhelming anxiety.?  Hypercholesterolemia?- ?Patient tolerating well without any side effects.? Patient reports compliance with Crestor at 40 mg daily.  HbA1C 6.0 in November  ED improved with sildenafil 20 mg 5 tabs as needed. ?Patient interested in trial of?Viagra 100 mg dosing if?cost is reasonable.??  Review of Systems  Constitutional:?No weight loss, no fever, no fatigue, no chills, no night sweats,?no weakness  Eyes:?No blurred vision,?no redness,?no drainage,?no ocular?pain  HEENT:?No sore throat,?no ear pain, no sinus pressure, no nasal congestion, no rhinorrhea, no postnasal drip  Respiratory:?No cough, no wheezing, no sputum production, no shortness of breath  Cardiovascular:?No chest pain, no palpitations, no dyspnea on exertion,?no orthopnea  Gastrointestinal:?No nausea, no vomiting, no abdominal pain, no diarrhea,?no constipation, no melena,?no hematochezia  Genitourinary:?No dysuria, no hematuria, no frequency, no urgency, no incontinence, no discharge  Musculoskeletal:?No myalgias, no arthralgias, no weakness, no joint effusion, no edema  Integumentary:?No rashes, no hives, no itching, no lesions, no jaundice  Neurologic:?No headaches, no numbness, no tingling, no weakness, no dizziness  Psychiatric:?anxiety, no irritability, no depression,?no suicidal ideations, no?homicidal ideations,?no delusions, no hallucinations  Endocrine:?No polyuria, no polydipsia, no polyphagia  Hematology:?No bruising, no lymphadenopathy,?no paleness  ?  Physical Exam  Vitals & Measurements  HR:?60(Peripheral)?  BP:?125/100?  HT:?177.5?cm? WT:?125.9?kg? BMI:?39.96?  General:?Well developed, Well-nourished, in No acute distress, A&O x 4  Cardiovascular:?Regular rate and rhythm, No murmurs, No gallops, No rubs  Respiratory:?Clear to auscultation bilaterally, No wheezes, No rhonchi, No?crackles  Abdomen:? Soft, Nontender, No hepatosplenomegaly, Normal and equal bowel sounds, No masses, No rebound, No guarding  Musculoskeletal:? No tenderness, FROM, No joint abnormality, No clubbing, No cyanosis,No?edema  Integumentary:?No rashes or skin lesions  Psychiatric:?Good insight,?appropriate thought process, normal affect,?appropriate?speech,  non-suicidal, cooperative, appropriate judgment  Assessment/Plan  1.?Hypertension?I10  ?Discussed importance of compliance with amlodipine 5 mg daily  Recommend home blood pressure monitoring?and call with blood pressure log  Ordered:  Clinic Follow up, *Est. 01/06/21 16:00:00 CST, Order for future visit, Hypertension, [facility]  Comprehensive Metabolic Panel, Routine collect, 07/22/20 16:49:00 CDT, Blood, Stop date 07/22/20 16:49:00 CDT, Lab Collect, Hypertension, 07/22/20 16:49:00 CDT  Office/Outpatient Visit Level 4 Established [id number] PC, Hypertension  Anxiety  ED (erectile dysfunction)  Hypercholesteremia  Prediabetes, [facility], 07/22/20 16:44:00 CDT  ?  2.?Anxiety?F41.9  ?Continue Xanax 0.5 mg as needed for overwhelming anxiety  Ordered:  Clinic Follow up, *Est. 01/06/21 16:00:00 CST, Order for future visit, Hypertension, [facility]  Office/Outpatient Visit Level 4 Established [id number] PC, Hypertension  Anxiety  ED (erectile dysfunction)  Hypercholesteremia  Prediabetes, [facility], 07/22/20 16:44:00 CDT  ?  3.?ED (erectile dysfunction)?N52.9  ?Prescription for Viagra 100 mg?as needed sent to pharmacy and if?cost not reasonable patient will?have sildenafil 20 mg?filled  Ordered:  Clinic Follow up, *Est. 01/06/21 16:00:00 CST, Order for future visit, Hypertension, [facility]  Office/Outpatient Visit Level 4 Established [id number] PC, Hypertension  Anxiety  ED (erectile dysfunction)  Hypercholesteremia  Prediabetes, [facility], 07/22/20 16:44:00 CDT  ?  4.?Hypercholesteremia?E78.00  ?CMP and FLP today  Ordered:  Clinic Follow up, *Est. 01/06/21 16:00:00 CST, Order for future visit, Hypertension, [facility]  Lipid Panel, Routine collect, 07/22/20 16:49:00 CDT, Blood, Stop date 07/22/20 16:49:00 CDT, Lab Collect, Hypercholesteremia, 07/22/20 16:49:00 CDT  Office/Outpatient Visit Level 4 Established [id number] PC, Hypertension  Anxiety  ED (erectile dysfunction)  Hypercholesteremia  Prediabetes, [facility], 07/22/20 16:44:00 CDT  ?  5.?Prediabetes?R73.03  ?Hemoglobin A1c today  Ordered:  Clinic Follow up, *Est. 01/06/21 16:00:00 CST, Order for future visit, Hypertension, [facility]  Office/Outpatient Visit Level 4 Established [id number] PC, Hypertension  Anxiety  ED (erectile dysfunction)  Hypercholesteremia  Prediabetes, [facility], 07/22/20 16:44:00 CDT  ?  Orders:  alPRAzolam, 0.5 mg = 1 tab(s), Oral, Daily, PRN PRN as needed for anxiety, # 30 tab(s), 2 Refill(s), Pharmacy: [id number], 177.5, cm, Height/Length Dosing, 07/22/20 15:34:00 CDT, 125.9, kg, Weight Dosing, 07/22/20 15:34:00 CDT  amLODIPine, 5 mg = 1 tab(s), Oral, Daily, # 30 tab(s), 5 Refill(s), Pharmacy: [facility]/pharmacy #[id number], 177.5, cm, Height/Length Dosing, 07/22/20 15:34:00 CDT, 125.9, kg, Weight Dosing, 07/22/20 15:34:00 CDT  sildenafil, 100 mg = 1 tab(s), Oral, Daily, 1 hour before sexual activity, # 10 tab(s), 5 Refill(s), Pharmacy: [facility]/pharmacy #[id number], 177.5, cm, Height/Length Dosing, 07/22/20 15:34:00 CDT, 125.9, kg, Weight Dosing, 07/22/20 15:34:00 CDT  sildenafil, See Instructions, 5 tabs prior to sexual activity, # 50 tab(s), 5 Refill(s), Pharmacy: [facility], 177.5, cm, Height/Length Dosing, 07/22/20 15:34:00 CDT, 125.9, kg, Weight Dosing, 07/22/20 15:34:00 CDT  Referrals  Clinic Follow  up, *Est. 01/06/21 16:00:00 CST, Order for future visit, Hypertension, [facility]   Problem List/Past Medical History  Ongoing  Anxiety  BPH (benign prostatic hyperplasia)  Contact dermatitis  ED (erectile dysfunction)  Hypercholesteremia  Hypertension  Obesity  Prediabetes  Historical  SAKINA - Obstructive sleep apnea  Procedure/Surgical History  Excision of gynecomastia   Medications  alPRAzolam 0.5 mg oral tablet, 0.5 mg= 1 tab(s), Oral, Daily, PRN, 2 refills  amLODIPine 5 mg oral tablet, 5 mg= 1 tab(s), Oral, Daily, 5 refills  rosuvastatin 40 mg oral tablet, 40 mg= 1 tab(s), Oral, Daily  sildenafil 100 mg oral tablet, 100 mg= 1 tab(s), Oral, Daily, 5 refills  sildenafil 20 mg oral tablet, See Instructions, 5 refills  Allergies  No Known Medication Allergies  Social History  Abuse/Neglect  No, 07/22/2020  Alcohol  Current, 03/07/2018  Employment/School  Employed, Work/School description: teacher., 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  CVA - Cerebrovascular accident: Father.  Cirrhosis of liver: Mother.  Hypertension.: Father.  Sister: History is negative  Sister: History is negative  Sister: History is negative  Immunizations  Vaccine Date Status   pneumococcal 13-valent conjugate vaccine 11/27/2019 Given   influenza virus vaccine, inactivated 11/01/2019 Recorded   influenza virus vaccine, inactivated 11/19/2018 Given   tetanus/diphtheria/pertussis, acel(Tdap) 07/2016 Recorded   influenza virus vaccine, inactivated 10/16/2014 Recorded   influenza virus vaccine, inactivated 10/15/2012 Recorded   Health Maintenance  Health Maintenance  ???Pending?(in the next year)  ??? ??OverDue  ??? ? ? ?Influenza Vaccine due??and every?  ??? ? ? ?Alcohol Misuse Screening due??01/02/20??and every 1??year(s)  ??? ??Due?  ??? ? ? ?ADL Screening due??07/22/20??and every 1??year(s)  ??? ? ? ?Aspirin Therapy for CVD  Prevention due??07/22/20??and every 1??year(s)  ??? ? ? ?Depression Screening due??07/22/20??and every?  ??? ? ? ?Hypertension Management-Education due??07/22/20??and every 1??year(s)  ??? ? ? ?Zoster Vaccine due??07/22/20??and every?  ??? ??Due In Future?  ??? ? ? ?Hypertension Management-BMP not due until??11/26/20??and every 1??year(s)  ??? ? ? ?Obesity Screening not due until??01/01/21??and every 1??year(s)  ???Satisfied?(in the past 1 year)  ??? ??Satisfied?  ??? ? ? ?Blood Pressure Screening on??07/22/20.??Satisfied by Shaheen Gómez Jr, MD  ??? ? ? ?Body Mass Index Check on??07/22/20.??Satisfied by Triny Cornejo LPN  ??? ? ? ?Diabetes Screening on??07/22/20.??Satisfied by Chata Giang  ??? ? ? ?Hypertension Management-Blood Pressure on??07/22/20.??Satisfied by Shaheen Gómez Jr, MD  ??? ? ? ?Influenza Vaccine on??11/01/19.??Satisfied by Shaheen Gómez Jr, MD  ??? ? ? ?Lipid Screening on??11/27/19.??Satisfied by Dameon Kohli  ??? ? ? ?Obesity Screening on??07/22/20.??Satisfied by Triny Cornejo LPN  ?

## 2022-05-19 PROBLEM — E11.9 TYPE 2 DIABETES MELLITUS: Status: ACTIVE | Noted: 2022-05-19

## 2022-05-19 PROBLEM — I10 HYPERTENSION: Status: ACTIVE | Noted: 2022-05-19

## 2022-05-19 PROBLEM — E66.9 OBESITY: Status: ACTIVE | Noted: 2022-05-19

## 2022-05-19 PROBLEM — E78.00 HYPERCHOLESTEROLEMIA: Status: ACTIVE | Noted: 2022-05-19

## 2022-05-19 PROBLEM — M17.9 OSTEOARTHRITIS OF KNEE: Status: ACTIVE | Noted: 2022-05-19

## 2022-05-19 PROBLEM — N52.9 ED (ERECTILE DYSFUNCTION): Status: ACTIVE | Noted: 2022-05-19

## 2022-05-19 PROBLEM — F41.9 ANXIETY DISORDER, UNSPECIFIED: Status: ACTIVE | Noted: 2022-05-19

## 2022-05-19 PROBLEM — M1A.0720 CHRONIC IDIOPATHIC GOUT OF LEFT FOOT: Status: ACTIVE | Noted: 2022-05-19

## 2022-05-19 PROBLEM — N40.0 BENIGN PROSTATIC HYPERPLASIA: Status: ACTIVE | Noted: 2022-05-19

## 2022-09-30 ENCOUNTER — DOCUMENTATION ONLY (OUTPATIENT)
Dept: ADMINISTRATIVE | Facility: HOSPITAL | Age: 62
End: 2022-09-30

## 2023-11-22 PROBLEM — K59.00 CONSTIPATION: Status: ACTIVE | Noted: 2023-11-22

## 2023-11-22 PROBLEM — J30.9 ALLERGIC RHINITIS: Status: ACTIVE | Noted: 2023-11-22
